# Patient Record
Sex: FEMALE | Race: WHITE | NOT HISPANIC OR LATINO | Employment: OTHER | URBAN - METROPOLITAN AREA
[De-identification: names, ages, dates, MRNs, and addresses within clinical notes are randomized per-mention and may not be internally consistent; named-entity substitution may affect disease eponyms.]

---

## 2017-03-01 PROBLEM — E66.9 OBESITY (BMI 35.0-39.9 WITHOUT COMORBIDITY): Status: ACTIVE | Noted: 2017-03-01

## 2017-03-02 PROBLEM — E78.5 HYPERLIPIDEMIA: Status: ACTIVE | Noted: 2017-03-02

## 2017-09-14 PROBLEM — E66.9 OBESITY (BMI 30-39.9): Status: ACTIVE | Noted: 2017-09-14

## 2018-07-09 PROBLEM — R60.0 LOCALIZED EDEMA: Status: ACTIVE | Noted: 2018-07-09

## 2019-03-18 PROBLEM — E55.9 VITAMIN D DEFICIENCY: Status: ACTIVE | Noted: 2019-03-18

## 2021-04-16 PROBLEM — U07.1 COVID-19: Status: ACTIVE | Noted: 2021-02-27

## 2021-04-16 PROBLEM — E55.9 VITAMIN D DEFICIENCY: Status: RESOLVED | Noted: 2019-03-18 | Resolved: 2021-04-16

## 2021-04-16 PROBLEM — F06.30 MOOD DISORDER DUE TO A GENERAL MEDICAL CONDITION: Status: ACTIVE | Noted: 2021-03-05

## 2021-04-18 PROBLEM — E66.01 CLASS 3 SEVERE OBESITY DUE TO EXCESS CALORIES WITH SERIOUS COMORBIDITY AND BODY MASS INDEX (BMI) OF 40.0 TO 44.9 IN ADULT (HCC): Status: ACTIVE | Noted: 2017-09-14

## 2021-04-18 PROBLEM — E66.9 OBESITY (BMI 35.0-39.9 WITHOUT COMORBIDITY): Status: RESOLVED | Noted: 2017-03-01 | Resolved: 2021-04-18

## 2021-06-10 ENCOUNTER — PATIENT OUTREACH (OUTPATIENT)
Dept: HEALTH INFORMATION MANAGEMENT | Facility: OTHER | Age: 76
End: 2021-06-10

## 2021-06-10 NOTE — PROGRESS NOTES
[facility], member declined due to other health reasons.    Attempt #1

## 2022-03-15 PROBLEM — M48.07 SPINAL STENOSIS, LUMBOSACRAL REGION: Status: ACTIVE | Noted: 2021-02-03

## 2022-03-15 PROBLEM — Z85.3 PERSONAL HISTORY OF MALIGNANT NEOPLASM OF BREAST: Status: ACTIVE | Noted: 2021-02-03

## 2022-03-15 PROBLEM — I10 ESSENTIAL (PRIMARY) HYPERTENSION: Status: ACTIVE | Noted: 2021-02-03

## 2022-03-15 PROBLEM — E87.6 HYPOKALEMIA: Status: ACTIVE | Noted: 2021-02-03

## 2022-03-15 PROBLEM — Z90.81 ACQUIRED ABSENCE OF SPLEEN: Status: ACTIVE | Noted: 2021-02-03

## 2022-03-15 PROBLEM — M41.35: Status: ACTIVE | Noted: 2021-02-03

## 2022-03-15 PROBLEM — M54.50 LOW BACK PAIN: Status: ACTIVE | Noted: 2021-02-03

## 2022-03-15 PROBLEM — Z99.81 DEPENDENCE ON SUPPLEMENTAL OXYGEN: Status: ACTIVE | Noted: 2021-02-03

## 2022-03-15 PROBLEM — K21.9 GASTRO-ESOPHAGEAL REFLUX DISEASE WITHOUT ESOPHAGITIS: Status: ACTIVE | Noted: 2021-02-03

## 2022-03-15 PROBLEM — Z90.710 ACQUIRED ABSENCE OF BOTH CERVIX AND UTERUS: Status: ACTIVE | Noted: 2021-02-03

## 2022-03-15 PROBLEM — R53.1 WEAKNESS: Status: ACTIVE | Noted: 2021-02-03

## 2022-03-15 PROBLEM — M54.16 RADICULOPATHY, LUMBAR REGION: Status: ACTIVE | Noted: 2021-02-03

## 2023-08-25 PROBLEM — N20.9 CALCULUS OF UPPER URINARY TRACT: Status: ACTIVE | Noted: 2023-08-25

## 2023-08-25 PROBLEM — L03.116 CELLULITIS OF LEFT LOWER EXTREMITY: Status: ACTIVE | Noted: 2023-08-25

## 2023-08-25 PROBLEM — L03.90 CELLULITIS: Status: ACTIVE | Noted: 2023-08-25

## 2023-08-25 PROBLEM — R91.1 LUNG NODULE: Status: ACTIVE | Noted: 2023-08-25

## 2023-08-25 PROBLEM — A41.9 SEPSIS (HCC): Status: ACTIVE | Noted: 2023-08-25

## 2023-08-25 PROBLEM — I87.8 CHRONIC VENOUS STASIS: Status: ACTIVE | Noted: 2023-08-25

## 2023-08-25 PROBLEM — R65.10 SIRS (SYSTEMIC INFLAMMATORY RESPONSE SYNDROME) (HCC): Status: ACTIVE | Noted: 2023-08-25

## 2023-08-25 PROBLEM — N30.01 ACUTE CYSTITIS WITH HEMATURIA: Status: ACTIVE | Noted: 2023-08-25

## 2024-01-25 PROBLEM — R65.10 SIRS (SYSTEMIC INFLAMMATORY RESPONSE SYNDROME) (HCC): Status: RESOLVED | Noted: 2023-08-25 | Resolved: 2024-01-25

## 2024-01-25 PROBLEM — A41.9 SEPSIS (HCC): Status: RESOLVED | Noted: 2023-08-25 | Resolved: 2024-01-25
